# Patient Record
(demographics unavailable — no encounter records)

---

## 2024-11-15 NOTE — ASSESSMENT
[FreeTextEntry1] : 1. Thyroid Cancer 2.  Postsurgical hypothyroidism Surgery: Total thyroidectomy with Dr. 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 No perineural invasion, no ETE, margins negative, no angiolymphatic invasion. 5/15 lymph nodes positive for PTC level 6 or level 7, largest 0.4 cm, no extranodal extension. 2015 JAVIER Risk: Intermediate AJCC 8th edition TNM Stage: [id number], stage I VALLE Treatment: 72.8 mCi on 2/24/2021 Posttherapy scan 3/2/2021: Iodine avid tissue at the base of the tongue and thyroid bed, no evidence of distant uptake.  Surveillance: Postoperative thyroglobulin has been undetectable with negative thyroglobulin antibodies, most recent 5/2024. Thyroid ultrasounds with bilateral nonspecific lymphadenopathy, most recent from 8/1/2024 with some benign-appearing LN bilaterally. 10/14/2021: FNA of left level 3 enlarged lymph node: Negative for malignant cells  PLAN: -TSH goal 0.5-2 given intermediate risk thyroid cancer with excellent response to therapy.  -Check thyroglobulin panel annually -Check neck ultrasound in 1 year, around 8/2025. Nonspecific benign-appearing lymphadenopathy.  Prescription for neck ultrasound provided. -Continue levothyroxine 137 mcg daily.  We may reduce this dose slightly given recently suppressed TSH, repeat TFTs to reassess. -Continue liothyronine 5 mcg twice a day  RTC in around September 2025, with previsit neck ultrasound.  Maria Luisa Baxter MD [facility] at [address], [address] Ph: [phone] Fax: [phone]

## 2024-11-15 NOTE — ASSESSMENT
[FreeTextEntry1] : 1. Thyroid Cancer 2.  Postsurgical hypothyroidism Surgery: Total thyroidectomy with Dr. 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 No perineural invasion, no ETE, margins negative, no angiolymphatic invasion. 5/15 lymph nodes positive for PTC level 6 or level 7, largest 0.4 cm, no extranodal extension. 2015 JAVIER Risk: Intermediate AJCC 8th edition TNM Stage: [id number], stage I VALLE Treatment: 72.8 mCi on 2/24/2021 Posttherapy scan 3/2/2021: Iodine avid tissue at the base of the tongue and thyroid bed, no evidence of distant uptake.  Surveillance: Postoperative thyroglobulin has been undetectable with negative thyroglobulin antibodies, most recent 5/2024. Thyroid ultrasounds with bilateral nonspecific lymphadenopathy, most recent from 8/1/2024 with some benign-appearing LN bilaterally. 10/14/2021: FNA of left level 3 enlarged lymph node: Negative for malignant cells  PLAN: -TSH goal 0.5-2 given intermediate risk thyroid cancer with excellent response to therapy.  -Check thyroglobulin panel annually -Check neck ultrasound in 1 year, around 8/2025. Nonspecific benign-appearing lymphadenopathy.  Prescription for neck ultrasound provided. -Continue levothyroxine 137 mcg daily.  We may reduce this dose slightly given recently suppressed TSH, repeat TFTs to reassess. -Continue liothyronine 5 mcg twice a day  RTC in around September 2025, with previsit neck ultrasound.  Maria Luisa Baxter MD [facility] at [address], [address] Ph: [phone] Fax: [phone]

## 2024-11-15 NOTE — HISTORY OF PRESENT ILLNESS
[FreeTextEntry1] : CHIEF COMPLAINT: Thyroid cancer, postsurgical hypothyroidism  HISTORY OF PRESENTING ILLNESS: The patient is a 36-year-old female being seen in the office today for evaluation of thyroid cancer, postsurgical hypothyroidism.   Initially discovered nodule: December 2018, 5 mm nodule in the right isthmus FNA: 2/2020, positive for malignancy, PTC  Surgery: Total thyroidectomy with Dr. 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 No perineural invasion, no ETE, margins negative, no angiolymphatic invasion. 5/15 lymph nodes positive for PTC level 6 or level 7, largest 0.4 cm, no extranodal extension. 2015 JAVIER Risk: Intermediate AJCC 8th edition TNM Stage: [id number], stage I VALLE Treatment: 72.8 mCi on 2/24/2021 Posttherapy scan 3/2/2021: Iodine avid tissue at the base of the tongue and thyroid bed, no evidence of distant uptake.   Surveillance: Postoperative thyroglobulin has been undetectable with negative thyroglobulin antibodies, most recent 5/2024. Thyroid ultrasounds with bilateral nonspecific lymphadenopathy, most recent from 8/1/2024 with some benign-appearing LN bilaterally. 10/14/2021: FNA of left level 3 enlarged lymph node: Negative for malignant cells  Postsurgical Hypothyroidism:  She is currently taking levothyroxine 137 mcg daily and liothyronine 5 mcg twice daily. Reports compliance with medication, appropriate medical hygiene.  Reports feeling well on current dose, energy levels seem to be good.  No palpitations or tremors, occasional constipation/diarrhea, occasional heat and cold intolerance.  Neck incision is well-healed and voice is normal.  TSH 0.12 in 8/2024.  No family history of thyroid cancer or thyroid disease.  She has a family history of diabetes. 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

## 2024-11-15 NOTE — HISTORY OF PRESENT ILLNESS
[FreeTextEntry1] : CHIEF COMPLAINT: Thyroid cancer, postsurgical hypothyroidism  HISTORY OF PRESENTING ILLNESS: The patient is a 36-year-old female being seen in the office today for evaluation of thyroid cancer, postsurgical hypothyroidism.   Initially discovered nodule: December 2018, 5 mm nodule in the right isthmus FNA: 2/2020, positive for malignancy, PTC  Surgery: Total thyroidectomy with Dr. 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 No perineural invasion, no ETE, margins negative, no angiolymphatic invasion. 5/15 lymph nodes positive for PTC level 6 or level 7, largest 0.4 cm, no extranodal extension. 2015 JAVIER Risk: Intermediate AJCC 8th edition TNM Stage: [id number], stage I VALLE Treatment: 72.8 mCi on 2/24/2021 Posttherapy scan 3/2/2021: Iodine avid tissue at the base of the tongue and thyroid bed, no evidence of distant uptake.   Surveillance: Postoperative thyroglobulin has been undetectable with negative thyroglobulin antibodies, most recent 5/2024. Thyroid ultrasounds with bilateral nonspecific lymphadenopathy, most recent from 8/1/2024 with some benign-appearing LN bilaterally. 10/14/2021: FNA of left level 3 enlarged lymph node: Negative for malignant cells  Postsurgical Hypothyroidism:  She is currently taking levothyroxine 137 mcg daily and liothyronine 5 mcg twice daily. Reports compliance with medication, appropriate medical hygiene.  Reports feeling well on current dose, energy levels seem to be good.  No palpitations or tremors, occasional constipation/diarrhea, occasional heat and cold intolerance.  Neck incision is well-healed and voice is normal.  TSH 0.12 in 8/2024.  No family history of thyroid cancer or thyroid disease.  She has a family history of diabetes. 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